# Patient Record
Sex: MALE | Race: WHITE
[De-identification: names, ages, dates, MRNs, and addresses within clinical notes are randomized per-mention and may not be internally consistent; named-entity substitution may affect disease eponyms.]

---

## 2018-07-17 ENCOUNTER — HOSPITAL ENCOUNTER (OUTPATIENT)
Dept: HOSPITAL 88 - CT | Age: 53
End: 2018-07-17
Attending: INTERNAL MEDICINE
Payer: COMMERCIAL

## 2018-07-17 DIAGNOSIS — J84.10: ICD-10-CM

## 2018-07-17 DIAGNOSIS — Z01.818: Primary | ICD-10-CM

## 2018-07-17 DIAGNOSIS — D86.9: ICD-10-CM

## 2018-07-17 DIAGNOSIS — R59.0: ICD-10-CM

## 2018-07-17 DIAGNOSIS — K21.9: ICD-10-CM

## 2018-07-17 PROCEDURE — 93005 ELECTROCARDIOGRAM TRACING: CPT

## 2018-07-17 PROCEDURE — 71250 CT THORAX DX C-: CPT

## 2018-07-17 NOTE — DIAGNOSTIC IMAGING REPORT
PROCEDURE: CT CHEST WITHOUT CONTRAST

CT scan of the chest WITHOUT intravenous contrast, using standard 

protocol.

 

TECHNIQUE:

The chest was scanned utilizing a multidetector helical scanner from 

the apex to the level of the adrenal glands.  No IV contrast was 

administered per physician's request. Coronal and sagittal multiplanar 

reformations were obtained.

 

COMPARISON: [facility], CT, CT CHEST W, 11/28/2017, 12:43.

 

INDICATIONS:   FIBROSIS

     

FINDINGS:

Lines/tubes: None.

 

Lungs and Airways: Stable 6 mm nodular density in the lingula (series 

3, image 55).

Stable 7 mm centrally calcified rickey-fissural nodule at the left major 

fissure (series 3, image 65).

Stable subpleural reticulation in the left upper lobe/lingula adjacent 

to the left major fissure (series 3, image 48), likely reflecting 

scarring.

Stable calcified granuloma in the right lower lobe (series 3, image 

87).

Stable linear opacities in the medial anterior left lower lobe (series 

3, image 82 and coronal image 68), consistent with scarring.

Airways are clear, without endobronchial lesions.

 

Pleura: No effusion, or pneumothorax.

 

Heart and mediastinum: Thyroid is unremarkable. Heart size is normal. 

No pericardial effusion. The aorta is non-aneurysmal. Main pulmonary 

artery is normal in caliber.

 

Lymph nodes: No significant interval change in bulky, partly calcified 

mediastinal and bilateral hilar adenopathy. No axillary adenopathy.

 

Abdomen: Limited views of the upper abdomen show no abnormality within 

the visualized liver, spleen, pancreas, or kidneys. The adrenal glands 

are normal.

 

Bones: No aggressive lytic lesions. Mild degenerative disc changes in 

the thoracic spine. Soft tissues are grossly unremarkable.

 

IMPRESSION: 

 

1. stable 6 mm nodule in the lingula. No other nodules are identified.

2. Stable left upper lobe/lingula. Left lower lobe areas of scarring. 

No significant generalized fibrotic changes.

3. Stable bulky partly calcified mediastinal and bilateral hilar 

adenopathy, suggesting prior granulomatous disease such as TB, 

histoplasmosis, or sarcoidosis.

 

 

 

Landry Smith M.D.  

Dictated by:  Landry Smith M.D. on 7/17/2018 at 16:50     

Electronically approved by:  Landry Smith M.D. on 7/17/2018 at 

16:50

## 2021-01-04 ENCOUNTER — HOSPITAL ENCOUNTER (OUTPATIENT)
Dept: HOSPITAL 88 - VACCPMC | Age: 56
End: 2021-01-04
Payer: COMMERCIAL

## 2021-01-04 DIAGNOSIS — Z20.822: ICD-10-CM

## 2021-01-04 DIAGNOSIS — Z23: Primary | ICD-10-CM

## 2021-02-01 ENCOUNTER — HOSPITAL ENCOUNTER (OUTPATIENT)
Dept: HOSPITAL 88 - VACCPMC | Age: 56
End: 2021-02-01
Payer: COMMERCIAL

## 2021-02-01 DIAGNOSIS — Z23: Primary | ICD-10-CM

## 2021-02-01 DIAGNOSIS — Z20.822: ICD-10-CM

## 2021-02-01 PROCEDURE — 91301: CPT

## 2021-02-01 PROCEDURE — 0012A: CPT
